# Patient Record
Sex: MALE | URBAN - METROPOLITAN AREA
[De-identification: names, ages, dates, MRNs, and addresses within clinical notes are randomized per-mention and may not be internally consistent; named-entity substitution may affect disease eponyms.]

---

## 2022-03-04 ENCOUNTER — OFFICE VISIT (OUTPATIENT)
Dept: URBAN - METROPOLITAN AREA CLINIC 5 | Facility: CLINIC | Age: 42
End: 2022-03-04
Payer: COMMERCIAL

## 2022-03-04 DIAGNOSIS — H16.212 EXPOSURE KERATOCONJUNCTIVITIS, LEFT EYE: ICD-10-CM

## 2022-03-04 DIAGNOSIS — H40.013 OPEN ANGLE WITH BORDERLINE FINDINGS, LOW RISK, BILATERAL: ICD-10-CM

## 2022-03-04 DIAGNOSIS — H18.623 KERATOCONUS, UNSTABLE, BILATERAL: Primary | ICD-10-CM

## 2022-03-04 PROCEDURE — 92004 COMPRE OPH EXAM NEW PT 1/>: CPT | Performed by: OPTOMETRIST

## 2022-03-04 ASSESSMENT — INTRAOCULAR PRESSURE
OS: 9
OD: 12

## 2022-03-04 NOTE — IMPRESSION/PLAN
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 Plan: OD<<<OS. Discussed diagnosis in detail with patient. Discussed treatment options with patient. Recommend consult with Dr Nohemi Rebolledo for possible CXL. Dr. Garcia Re explained why it may be beneficial to get the treatment in the right eye to prevent progression. Please get pentacam next visit.

## 2022-03-04 NOTE — IMPRESSION/PLAN
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 Exposure keratoconjunctivitis/keratopathy  - advised the patient to try ophthalmic ointment qhs and/or sleep mask.

## 2022-04-13 ENCOUNTER — OFFICE VISIT (OUTPATIENT)
Dept: URBAN - METROPOLITAN AREA CLINIC 3 | Facility: CLINIC | Age: 42
End: 2022-04-13
Payer: COMMERCIAL

## 2022-04-13 DIAGNOSIS — H40.013 OPEN ANGLE WITH BORDERLINE FINDINGS, LOW RISK, BILATERAL: ICD-10-CM

## 2022-04-13 DIAGNOSIS — H18.622 KERATOCONUS, UNSTABLE, LEFT EYE: Primary | ICD-10-CM

## 2022-04-13 PROCEDURE — 92014 COMPRE OPH EXAM EST PT 1/>: CPT | Performed by: OPHTHALMOLOGY

## 2022-04-13 PROCEDURE — 92025 CPTRIZED CORNEAL TOPOGRAPHY: CPT | Performed by: OPHTHALMOLOGY

## 2022-04-13 RX ORDER — LISINOPRIL 20 MG/1
20 MG TABLET ORAL
Qty: 0 | Refills: 0 | Status: ACTIVE
Start: 2022-04-13

## 2022-04-13 NOTE — IMPRESSION/PLAN
Impression: Keratoconus, unstable, left eye: R19.999. Plan: Unstable Keratoconus Left eye - Confirmed with baseline pentacam which shows posterior steepening. Patient c/o progressive worsening of vision despite new glasses and contacts. Recommended collagen cross linking in LEFT eye only. RTC in 1 month for repeat refraction and topography to determine if ready to book collagen cross linking.

## 2022-04-13 NOTE — IMPRESSION/PLAN
Impression: Open angle with borderline findings, low risk, bilateral: H40.013. Plan: Low risk glaucoma suspect due to large cup to disc ratios in both eyes - The pathophysiology of glaucoma and the difference between having glaucoma and being a glaucoma suspect were discussed with the patient.

## 2022-05-11 ENCOUNTER — OFFICE VISIT (OUTPATIENT)
Dept: URBAN - METROPOLITAN AREA CLINIC 3 | Facility: CLINIC | Age: 42
End: 2022-05-11
Payer: COMMERCIAL

## 2022-05-11 DIAGNOSIS — H18.611 KERATOCONUS, STABLE, RIGHT EYE: ICD-10-CM

## 2022-05-11 DIAGNOSIS — H18.622 KERATOCONUS, UNSTABLE, LEFT EYE: Primary | ICD-10-CM

## 2022-05-11 PROCEDURE — 92025 CPTRIZED CORNEAL TOPOGRAPHY: CPT | Performed by: OPHTHALMOLOGY

## 2022-05-11 PROCEDURE — 92012 INTRM OPH EXAM EST PATIENT: CPT | Performed by: OPHTHALMOLOGY

## 2022-05-11 ASSESSMENT — INTRAOCULAR PRESSURE
OS: 8
OD: 11

## 2022-05-11 ASSESSMENT — KERATOMETRY: OD: 45.00

## 2022-05-11 NOTE — IMPRESSION/PLAN
Impression: Keratoconus, stable, right eye: H18.611. Plan: Keratoconus- repeat pentacam shows that there is no progression of keratoconus, no indication for corneal collagen cross linking at this time.

## 2022-05-11 NOTE — IMPRESSION/PLAN
Impression: Keratoconus, unstable, left eye: M63.188. Plan: Keratoconus- Confirmed with repeat pentacam which shows posterior steepening that is worse compared to the last Pentacam. Pentacam shows more than 1D of Keratometry steepening in the Left eye. Also the patient notices a rapid worsening of the vision. Recommended to proceed with collagen cross linking in the Left eye only. R/B/A discussed with patient. Advised to discontinue eye rubbing and avoiding external pressure to eyes while sleeping even after procedure. Plan to schedule n/a.

## 2022-06-23 ENCOUNTER — PROCEDURE (OUTPATIENT)
Dept: URBAN - METROPOLITAN AREA CLINIC 3 | Facility: CLINIC | Age: 42
End: 2022-06-23
Payer: COMMERCIAL

## 2022-06-23 DIAGNOSIS — H18.622 KERATOCONUS, UNSTABLE, LEFT EYE: Primary | ICD-10-CM

## 2022-06-23 PROCEDURE — 0402T COLGN CRS-LINK CRN&PACHYMTRY: CPT | Performed by: OPHTHALMOLOGY

## 2022-06-23 NOTE — IMPRESSION/PLAN
Impression: Keratoconus, unstable, left eye: Z57.781. Plan: Unstable Keratoconus Left eye - R/B/A of corneal collagen cross linking discussed with patient who desires to proceed. Informed consent obtained. CXL performed using the [employer] system w/o complications.  F/U in 1 week for BCL removal.

## 2022-06-29 ENCOUNTER — POST-OPERATIVE VISIT (OUTPATIENT)
Dept: URBAN - METROPOLITAN AREA CLINIC 3 | Facility: CLINIC | Age: 42
End: 2022-06-29
Payer: COMMERCIAL

## 2022-06-29 DIAGNOSIS — Z48.810 ENCOUNTER FOR SURGICAL AFTERCARE FOLLOWING SURGERY ON A SENSE ORGAN: Primary | ICD-10-CM

## 2022-06-29 PROCEDURE — 99024 POSTOP FOLLOW-UP VISIT: CPT | Performed by: OPHTHALMOLOGY

## 2022-06-29 NOTE — IMPRESSION/PLAN
Impression: S/P CXL OS - 6 Days. Encounter for surgical aftercare following surgery on a sense organ  Z48.810. Plan: POD#6 S/P CXL Left eye- healing well, BCL removed. Continue Pred-Moxi eye drops QID until Friday, then complete a 3 week taper with PF 1%. Cont. Vitamin C 1,000mg PO QD for 3 weeks, cont. sunglasses protection and preservative free artificial tears Q1hour with taper of 1 hour per week. F/U in 3-4 weeks, sooner prn.

## 2022-08-10 ENCOUNTER — OFFICE VISIT (OUTPATIENT)
Dept: URBAN - METROPOLITAN AREA CLINIC 3 | Facility: CLINIC | Age: 42
End: 2022-08-10
Payer: COMMERCIAL

## 2022-08-10 DIAGNOSIS — H18.612 KERATOCONUS, STABLE, LEFT EYE: Primary | ICD-10-CM

## 2022-08-10 DIAGNOSIS — H18.611 KERATOCONUS, STABLE, RIGHT EYE: ICD-10-CM

## 2022-08-10 PROCEDURE — 92025 CPTRIZED CORNEAL TOPOGRAPHY: CPT | Performed by: OPHTHALMOLOGY

## 2022-08-10 PROCEDURE — 92012 INTRM OPH EXAM EST PATIENT: CPT | Performed by: OPHTHALMOLOGY

## 2022-08-10 ASSESSMENT — INTRAOCULAR PRESSURE
OS: 19
OD: 13

## 2022-08-10 NOTE — IMPRESSION/PLAN
Impression: Keratoconus, stable, right eye: H18.611. Plan: Repeat Pentacam shows no signs of progression. Stressed the importance of no eye rubbing or sleeping face down.  Pt will wait until new doctor is with [facility] to get glasses RX

## 2022-08-10 NOTE — IMPRESSION/PLAN
Impression: Keratoconus, stable, left eye: H18.612. Plan: POM#1 s/p CXL Left eye- well healed, no significant haze, patient happy with early results. Repeat Pentacam shows early steepening but patient feels sees better. Cont. sunglasses protection and artificial tears QID.  F/U in 3 months for final PO and repeat Pentacam.